# Patient Record
Sex: FEMALE | Race: ASIAN | Employment: STUDENT | URBAN - METROPOLITAN AREA
[De-identification: names, ages, dates, MRNs, and addresses within clinical notes are randomized per-mention and may not be internally consistent; named-entity substitution may affect disease eponyms.]

---

## 2024-03-13 ENCOUNTER — OFFICE VISIT (OUTPATIENT)
Dept: FAMILY MEDICINE CLINIC | Facility: CLINIC | Age: 19
End: 2024-03-13
Payer: COMMERCIAL

## 2024-03-13 VITALS
HEART RATE: 64 BPM | OXYGEN SATURATION: 100 % | TEMPERATURE: 97 F | SYSTOLIC BLOOD PRESSURE: 98 MMHG | BODY MASS INDEX: 19.08 KG/M2 | RESPIRATION RATE: 18 BRPM | WEIGHT: 105 LBS | DIASTOLIC BLOOD PRESSURE: 60 MMHG | HEIGHT: 62.01 IN

## 2024-03-13 DIAGNOSIS — Z13.29 SCREENING FOR ENDOCRINE, METABOLIC, AND IMMUNITY DISORDER: ICD-10-CM

## 2024-03-13 DIAGNOSIS — E55.9 VITAMIN D DEFICIENCY: ICD-10-CM

## 2024-03-13 DIAGNOSIS — R53.83 OTHER FATIGUE: ICD-10-CM

## 2024-03-13 DIAGNOSIS — Z13.0 SCREENING FOR ENDOCRINE, METABOLIC, AND IMMUNITY DISORDER: ICD-10-CM

## 2024-03-13 DIAGNOSIS — F32.A ANXIETY AND DEPRESSION: ICD-10-CM

## 2024-03-13 DIAGNOSIS — Z13.6 SCREENING FOR CARDIOVASCULAR CONDITION: ICD-10-CM

## 2024-03-13 DIAGNOSIS — Z23 NEED FOR VACCINATION: ICD-10-CM

## 2024-03-13 DIAGNOSIS — Z13.228 SCREENING FOR ENDOCRINE, METABOLIC, AND IMMUNITY DISORDER: ICD-10-CM

## 2024-03-13 DIAGNOSIS — F41.9 ANXIETY AND DEPRESSION: ICD-10-CM

## 2024-03-13 DIAGNOSIS — Z00.00 ANNUAL PHYSICAL EXAM: Primary | ICD-10-CM

## 2024-03-13 PROCEDURE — 99385 PREV VISIT NEW AGE 18-39: CPT | Performed by: FAMILY MEDICINE

## 2024-03-13 RX ORDER — ESCITALOPRAM OXALATE 20 MG/1
20 TABLET ORAL NIGHTLY
COMMUNITY
Start: 2024-02-23

## 2024-03-13 NOTE — PROGRESS NOTES
Chief Complaint   Patient presents with    Physical     Annual exam     New Patient     Establish care        HPI:   Ariadne Castaneda is a 18 year old female who presents for a complete physical with gyne exam.   Patient feels well, dental visit up to date, no hearing problem.  Vaccinations: UTD    LMP:3/06/24  Sexual activity:not active  Contraception: not interested  Exercise:  goes to the gym 3/06/24, occasional.  Diet:  regular    Wt Readings from Last 3 Encounters:   03/13/24 105 lb (47.6 kg) (8%, Z= -1.40)*     * Growth percentiles are based on CDC (Girls, 2-20 Years) data.      BP Readings from Last 3 Encounters:   03/13/24 98/60     Patient's last menstrual period was 03/06/2024 (approximate).     Ariadne is a 19 yo F here to establish care.    Annual physical:  Overall pt states she feels well. 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.    LMP: 3/6/24  Sexually Active: not active  Last pap smear: n/a  Last mammogram: n/a  Last colonoscopy: n/a   Last DEXA Scan: n/a    Exercise: goes to the gym daily, cardio and wts  Diet: regular    Anxiety and Depression: started Lexapro 20 mg in Feb 2023, sees Psychology for this.  Mood is stable, no SI.  Never been hospitalized.  She has a therapist she sees weekly. She has fatigue.     Vit D def: pt has been low in past. Due to recheck.           Current Outpatient Medications   Medication Sig Dispense Refill    escitalopram 20 MG Oral Tab Take 1 tablet (20 mg total) by mouth nightly. TAKE AT BEDTIME        History reviewed. No pertinent past medical history.   History reviewed. No pertinent surgical history.   Family History   Problem Relation Age of Onset    Breast Cancer Neg     Colon Cancer Neg     Ovarian Cancer Neg       Social History:  Social History     Socioeconomic History    Marital status: Single   Tobacco Use    Smoking status: Never    Smokeless tobacco: Never   Substance and Sexual Activity    Alcohol use: Yes      Comment: some, liquor    Drug use: Never       Allergies:  Not on File     REVIEW OF SYSTEMS:     Review of Systems   Constitutional:  Positive for fatigue.   Gastrointestinal:  Negative for abdominal pain, diarrhea, nausea and vomiting.   Genitourinary:  Negative for dysuria and menstrual problem.   Psychiatric/Behavioral:  Negative for decreased concentration, dysphoric mood, sleep disturbance and suicidal ideas. The patient is not nervous/anxious.         EXAM:   BP 98/60 (BP Location: Left arm, Patient Position: Sitting, Cuff Size: adult)   Pulse 64   Temp 97.1 °F (36.2 °C) (Temporal)   Resp 18   Ht 5' 2.01\" (1.575 m)   Wt 105 lb (47.6 kg)   LMP 03/06/2024 (Approximate)   SpO2 100%   BMI 19.20 kg/m²    GENERAL: WD/WN in no acute distress.   HEENT: PERRLA and EOMI.  OP moist no lesions.TM WNL, luis.Normal ears canals bilaterally.  Neck is supple, with no cervical LAD or thyroid abnormalities.  LUNGS: are clear to auscultation bilaterally, with no wheeze, rhonchi, or rales.   HEART: is RRR.  S1, S2, with no murmurs,clicks, gallops  BREAST:deferred  ABDOMEN: is soft,NBS, NT/ND with no HSM.  No rebound or guarding. No CVA tenderness, no hernias.   EXAM: deferred   RECTAL EXAM: deferred  NEURO: Cranial nerves II-XII normal,no focal abnormalities, and reflexes coordination and gait normal and symmetric.Sensation intact.  EXTREMETIES: are symmetric with no cyanosis, clubbing, or edema.  MS: Normal muscles tones, no joints abnormalities.  SKIN: Normal color, turgor, no lesions, rashes or wounds.  PSYCH: normal affect and mood.    ASSESSMENT AND PLAN:     18 year old female with     1. Annual physical exam  Routine labs ordered today, await results. Counseled pt on healthy lifestyle changes. Vaccines today: UTD . Contraception: not interested .     - Comp Metabolic Panel (14) [E]  - CBC [6399] [Q]  - TSH W REFLEX TO FREE T4 [96312][Q]  - LIPID PANEL [4910] [Q]    2. Anxiety and depression  - mood stable, sees  Psychology and has weekly therapy  - cont Lexapro as Rx'd by Psych    3. Vitamin D deficiency  - due to recheck, has been low in past    - VITAMIN D, 25-HYDROXY [46003][Q]    4. Need for vaccination    - INFLUENZA REFUSED EE    5. Other fatigue  - check labs, await results  - if workup neg, than may be due to anxiety/depression    - Comp Metabolic Panel (14) [E]  - CBC [6399] [Q]  - TSH W REFLEX TO FREE T4 [43765][Q]    6. Screening for endocrine, metabolic, and immunity disorder    - Comp Metabolic Panel (14) [E]  - CBC [6399] [Q]  - TSH W REFLEX TO FREE T4 [94602][Q]    7. Screening for cardiovascular condition    - LIPID PANEL [3080] [Q]        Pt's was recommended low fat diet and aerobic exercise 30 minutes three times weekly.   Health maintenance.   Osteoporosis prevention addressed  Recommended whole food type diet, eliminate processed food/low sugar and low sat fat diet      The patient indicates understanding of these issues and agrees to the plan.    Return in about 1 year (around 3/13/2025) for annual physical.

## 2024-06-11 LAB
ABSOLUTE BASOPHILS: 72 CELLS/UL (ref 0–200)
ABSOLUTE EOSINOPHILS: 341 CELLS/UL (ref 15–500)
ABSOLUTE LYMPHOCYTES: 2041 CELLS/UL (ref 850–3900)
ABSOLUTE MONOCYTES: 446 CELLS/UL (ref 200–950)
ABSOLUTE NEUTROPHILS: 2602 CELLS/UL (ref 1500–7800)
ALBUMIN/GLOBULIN RATIO: 1.9 (CALC) (ref 1–2.5)
ALBUMIN: 4.4 G/DL (ref 3.6–5.1)
ALKALINE PHOSPHATASE: 47 U/L (ref 36–128)
ALT: 12 U/L (ref 5–32)
AST: 20 U/L (ref 12–32)
BASOPHILS: 1.3 %
BILIRUBIN, TOTAL: 0.4 MG/DL (ref 0.2–1.1)
BUN: 11 MG/DL (ref 7–20)
CALCIUM: 9.7 MG/DL (ref 8.9–10.4)
CARBON DIOXIDE: 22 MMOL/L (ref 20–32)
CHLORIDE: 106 MMOL/L (ref 98–110)
CHOL/HDLC RATIO: 3.4 (CALC)
CHOLESTEROL, TOTAL: 197 MG/DL
CREATININE: 0.86 MG/DL (ref 0.5–0.96)
EGFR: 100 ML/MIN/1.73M2
EOSINOPHILS: 6.2 %
GLOBULIN: 2.3 G/DL (CALC) (ref 2–3.8)
GLUCOSE: 98 MG/DL (ref 65–99)
HDL CHOLESTEROL: 58 MG/DL
HEMATOCRIT: 44.3 % (ref 35–45)
HEMOGLOBIN: 13.7 G/DL (ref 11.7–15.5)
LDL-CHOLESTEROL: 119 MG/DL (CALC)
LYMPHOCYTES: 37.1 %
MCH: 26.8 PG (ref 27–33)
MCHC: 30.9 G/DL (ref 32–36)
MCV: 86.5 FL (ref 80–100)
MONOCYTES: 8.1 %
MPV: 9.9 FL (ref 7.5–12.5)
NEUTROPHILS: 47.3 %
NON-HDL CHOLESTEROL: 139 MG/DL (CALC)
PLATELET COUNT: 259 THOUSAND/UL (ref 140–400)
POTASSIUM: 4.1 MMOL/L (ref 3.8–5.1)
PROTEIN, TOTAL: 6.7 G/DL (ref 6.3–8.2)
RDW: 14.3 % (ref 11–15)
RED BLOOD CELL COUNT: 5.12 MILLION/UL (ref 3.8–5.1)
SODIUM: 138 MMOL/L (ref 135–146)
TRIGLYCERIDES: 98 MG/DL
TSH W/REFLEX TO FT4: 3.02 MIU/L
VITAMIN D, 25-OH, TOTAL: 47 NG/ML (ref 30–100)
WHITE BLOOD CELL COUNT: 5.5 THOUSAND/UL (ref 3.8–10.8)

## 2025-03-17 ENCOUNTER — OFFICE VISIT (OUTPATIENT)
Dept: FAMILY MEDICINE CLINIC | Facility: CLINIC | Age: 20
End: 2025-03-17
Payer: COMMERCIAL

## 2025-03-17 VITALS
SYSTOLIC BLOOD PRESSURE: 98 MMHG | RESPIRATION RATE: 16 BRPM | BODY MASS INDEX: 19.94 KG/M2 | TEMPERATURE: 98 F | HEART RATE: 68 BPM | DIASTOLIC BLOOD PRESSURE: 80 MMHG | HEIGHT: 62 IN | WEIGHT: 108.38 LBS | OXYGEN SATURATION: 100 %

## 2025-03-17 DIAGNOSIS — Z13.0 SCREENING FOR ENDOCRINE, METABOLIC, AND IMMUNITY DISORDER: ICD-10-CM

## 2025-03-17 DIAGNOSIS — K21.9 GASTROESOPHAGEAL REFLUX DISEASE, UNSPECIFIED WHETHER ESOPHAGITIS PRESENT: ICD-10-CM

## 2025-03-17 DIAGNOSIS — F32.A ANXIETY AND DEPRESSION: ICD-10-CM

## 2025-03-17 DIAGNOSIS — Z13.29 SCREENING FOR ENDOCRINE, METABOLIC, AND IMMUNITY DISORDER: ICD-10-CM

## 2025-03-17 DIAGNOSIS — F41.9 ANXIETY AND DEPRESSION: ICD-10-CM

## 2025-03-17 DIAGNOSIS — Z00.00 ANNUAL PHYSICAL EXAM: Primary | ICD-10-CM

## 2025-03-17 DIAGNOSIS — Z13.6 SCREENING FOR CARDIOVASCULAR CONDITION: ICD-10-CM

## 2025-03-17 DIAGNOSIS — Z13.228 SCREENING FOR ENDOCRINE, METABOLIC, AND IMMUNITY DISORDER: ICD-10-CM

## 2025-03-17 DIAGNOSIS — E55.9 VITAMIN D DEFICIENCY: ICD-10-CM

## 2025-03-17 PROCEDURE — 99395 PREV VISIT EST AGE 18-39: CPT | Performed by: FAMILY MEDICINE

## 2025-03-17 RX ORDER — OMEPRAZOLE 40 MG/1
40 CAPSULE, DELAYED RELEASE ORAL
Qty: 30 CAPSULE | Refills: 1 | Status: SHIPPED | OUTPATIENT
Start: 2025-03-17

## 2025-03-17 RX ORDER — ALBUTEROL SULFATE 90 UG/1
2 INHALANT RESPIRATORY (INHALATION) EVERY 6 HOURS PRN
COMMUNITY
Start: 2023-02-28 | End: 2025-03-17 | Stop reason: ALTCHOICE

## 2025-03-20 NOTE — PROGRESS NOTES
Chief Complaint   Patient presents with    Physical       HPI:   Ariadne Castaneda is a 19 year old female who presents for a complete physical without gyne exam.   Patient feels well, dental visit up to date, no hearing problem.  Vaccinations up to date.    LMP: 3/10/25  Sexual activity:monogamy   Contraception: not interested  Exercise:  working out 5-6x/week .  Diet:  regular    Wt Readings from Last 3 Encounters:   03/17/25 108 lb 6.4 oz (49.2 kg)   03/13/24 105 lb (47.6 kg) (8%, Z= -1.40)*   08/05/19 84 lb 6.4 oz (38.3 kg) (1%, Z= -2.19)*     * Growth percentiles are based on [facility] (Girls, 2-20 Years) data.      BP Readings from Last 3 Encounters:   03/17/25 98/80   03/13/24 98/60   08/05/19 100/80 (29%, Z = -0.55 /  95%, Z = 1.64)*     *BP percentiles are based on the 2017 AAP Clinical Practice Guideline for girls     Patient's last menstrual period was 03/10/2025 (approximate).     Annual physical:  Overall pt states she feels well. She is on Spring break right now.  Attending [employer] doug Pandey at [employer].    LMP: 3/10/25  Sexually Active: not discussed  Last pap smear: n/a  Last mammogram:n/a  Last Colon Ca screening: n/a  Last DEXA Scan: n/a    Exercise: working out 5-6x/week  Diet: regular    GERD: pt reports ab abdominal pain sensation in the upper stomach. Has  no N/V, no melanotic stools.  She drinks some caffeine, eats a regular diet.      Anxiety and depression:  No longer taking medication for this. Is seeing a therapist weekly for this, stable. Energy level is good.  Appetite good.          Current Outpatient Medications   Medication Sig Dispense Refill    Omeprazole 40 MG Oral Capsule Delayed Release Take 1 capsule (40 mg total) by mouth every morning before breakfast. 30 capsule 1      History reviewed. No pertinent past medical history.   History reviewed. No pertinent surgical history.   Family History   Problem Relation Age of Onset    Breast Cancer Neg     Colon Cancer Neg     Ovarian  Cancer Neg       Social History:  Social History     Socioeconomic History    Marital status: Single   Tobacco Use    Smoking status: Never    Smokeless tobacco: Never   Vaping Use    Vaping status: Never Used   Substance and Sexual Activity    Alcohol use: Yes     Comment: some, liquor    Drug use: Never    Sexual activity: Never   Social History Narrative    ** Merged History Encounter **          Social Drivers of Health      Received from [facility]    Housing Stability       Allergies:  Allergies[1]     REVIEW OF SYSTEMS:     Review of Systems   Constitutional:  Negative for appetite change, diaphoresis and fever.   Gastrointestinal:  Positive for abdominal pain. Negative for blood in stool, constipation, diarrhea, nausea and vomiting.   Genitourinary:  Negative for dysuria and menstrual problem.   Psychiatric/Behavioral:  Negative for behavioral problems, decreased concentration, dysphoric mood and sleep disturbance. The patient is not nervous/anxious.         EXAM:   BP 98/80 (BP Location: Left arm, Patient Position: Sitting, Cuff Size: adult)   Pulse 68   Temp 98 °F (36.7 °C) (Temporal)   Resp 16   Ht 5' 2\" (1.575 m)   Wt 108 lb 6.4 oz (49.2 kg)   LMP 03/10/2025 (Approximate)   SpO2 100%   BMI 19.83 kg/m²    GENERAL: WD/WN in no acute distress.   HEENT: PERRLA and EOMI.  OP moist no lesions.TM WNL, luis.Normal ears canals bilaterally.  Neck is supple, with no cervical LAD or thyroid abnormalities.  LUNGS: are clear to auscultation bilaterally, with no wheeze, rhonchi, or rales.   HEART: is RRR.  S1, S2, with no murmurs,clicks, gallops  BREAST:deferred  ABDOMEN: is soft,NBS, NT/ND with no HSM.  No rebound or guarding. No CVA tenderness, no hernias.   EXAM: deferred  RECTAL EXAM: deferred  NEURO: Cranial nerves II-XII normal,no focal abnormalities, and reflexes coordination and gait normal and symmetric.Sensation intact.  EXTREMETIES: are symmetric with no cyanosis, clubbing, or  edema.  MS: Normal muscles tones, no joints abnormalities.  SKIN: Normal color, turgor, no lesions, rashes or wounds.  PSYCH: normal affect and mood.    ASSESSMENT AND PLAN:     19 year old female with     1. Annual physical exam  Routine labs ordered today, await results. Counseled pt on healthy lifestyle changes. Vaccines today: UTD . Contraception: not interested .    Pap smear will be due starting in 7/2025     - CBC [6399] [Q]  - COMP METABOLIC PANEL [41698] [Q]  - TSH W REFLEX TO FREE T4 [98805][Q]  - LIPID PANEL [7600] [Q]    2. Anxiety and depression  - mood stable,attending weekly therapy  - no longer on Lexapro, doing well.    3. Vitamin D deficiency  - due to recheck Vit D level  - has been low in past    - VITAMIN D, 25-HYDROXY [42136][Q]    4. Screening for endocrine, metabolic, and immunity disorder    - CBC [6399] [Q]  - COMP METABOLIC PANEL [91460] [Q]  - TSH W REFLEX TO FREE T4 [39717][Q]    5. Screening for cardiovascular condition    - LIPID PANEL [7600] [Q]    6. Gastroesophageal reflux disease, unspecified whether esophagitis present  - START trial of Omeprazole QAM x 1-2 months  - antireflux diet and lifestyle changes d/w pt  - if sx worsen or persist, advised to f-u    - Omeprazole 40 MG Oral Capsule Delayed Release; Take 1 capsule (40 mg total) by mouth every morning before breakfast.  Dispense: 30 capsule; Refill: 1        Pt's was recommended low fat diet and aerobic exercise 30 minutes three times weekly.   Health maintenance.   Osteoporosis prevention addressed  Recommended whole food type diet, eliminate processed food/low sugar and low sat fat diet      The patient indicates understanding of these issues and agrees to the plan.    Return in about 1 year (around 3/17/2026) for annual physical.       [1] No Known Allergies

## 2025-08-25 LAB
ABSOLUTE BASOPHILS: 63 CELLS/UL (ref 0–200)
ABSOLUTE EOSINOPHILS: 458 CELLS/UL (ref 15–500)
ABSOLUTE LYMPHOCYTES: 1533 CELLS/UL (ref 850–3900)
ABSOLUTE MONOCYTES: 553 CELLS/UL (ref 200–950)
ABSOLUTE NEUTROPHILS: 5293 CELLS/UL (ref 1500–7800)
ALBUMIN/GLOBULIN RATIO: 2 (CALC) (ref 1–2.5)
ALBUMIN: 4.1 G/DL (ref 3.6–5.1)
ALKALINE PHOSPHATASE: 52 U/L (ref 31–125)
ALT: 23 U/L (ref 6–29)
AST: 57 U/L (ref 10–30)
BASOPHILS: 0.8 %
BILIRUBIN, TOTAL: 0.6 MG/DL (ref 0.2–1.2)
BUN/CREATININE RATIO: 9 (CALC) (ref 6–22)
BUN: 6 MG/DL (ref 7–25)
CALCIUM: 9.1 MG/DL (ref 8.6–10.2)
CARBON DIOXIDE: 24 MMOL/L (ref 20–32)
CHLORIDE: 106 MMOL/L (ref 98–110)
CHOL/HDLC RATIO: 3.5 (CALC)
CHOLESTEROL, TOTAL: 157 MG/DL
CREATININE: 0.66 MG/DL (ref 0.5–0.96)
EGFR: 128 ML/MIN/1.73M2
EOSINOPHILS: 5.8 %
GLOBULIN: 2.1 G/DL (CALC) (ref 1.9–3.7)
GLUCOSE: 74 MG/DL (ref 65–139)
HDL CHOLESTEROL: 45 MG/DL
HEMATOCRIT: 41 % (ref 35–45)
HEMOGLOBIN: 13.2 G/DL (ref 11.7–15.5)
LDL-CHOLESTEROL: 96 MG/DL (CALC)
LYMPHOCYTES: 19.4 %
MCH: 28.9 PG (ref 27–33)
MCHC: 32.2 G/DL (ref 32–36)
MCV: 89.9 FL (ref 80–100)
MONOCYTES: 7 %
MPV: 9.7 FL (ref 7.5–12.5)
NEUTROPHILS: 67 %
NON-HDL CHOLESTEROL: 112 MG/DL (CALC)
PLATELET COUNT: 246 THOUSAND/UL (ref 140–400)
POTASSIUM: 3.8 MMOL/L (ref 3.5–5.3)
PROTEIN, TOTAL: 6.2 G/DL (ref 6.1–8.1)
RDW: 12.7 % (ref 11–15)
RED BLOOD CELL COUNT: 4.56 MILLION/UL (ref 3.8–5.1)
SODIUM: 138 MMOL/L (ref 135–146)
TRIGLYCERIDES: 73 MG/DL
TSH W/REFLEX TO FT4: 1.48 MIU/L
VITAMIN D, 25-OH, TOTAL: 34 NG/ML (ref 30–100)
WHITE BLOOD CELL COUNT: 7.9 THOUSAND/UL (ref 3.8–10.8)